# Patient Record
Sex: FEMALE | Race: WHITE | NOT HISPANIC OR LATINO | URBAN - METROPOLITAN AREA
[De-identification: names, ages, dates, MRNs, and addresses within clinical notes are randomized per-mention and may not be internally consistent; named-entity substitution may affect disease eponyms.]

---

## 2022-12-14 ENCOUNTER — OFFICE VISIT (OUTPATIENT)
Dept: URGENT CARE | Facility: CLINIC | Age: 72
End: 2022-12-14
Payer: COMMERCIAL

## 2022-12-14 VITALS
DIASTOLIC BLOOD PRESSURE: 63 MMHG | HEIGHT: 63 IN | SYSTOLIC BLOOD PRESSURE: 133 MMHG | WEIGHT: 145 LBS | OXYGEN SATURATION: 95 % | HEART RATE: 56 BPM | RESPIRATION RATE: 14 BRPM | TEMPERATURE: 98 F | BODY MASS INDEX: 25.69 KG/M2

## 2022-12-14 DIAGNOSIS — Z76.0 ENCOUNTER FOR MEDICATION REFILL: Primary | ICD-10-CM

## 2022-12-14 PROCEDURE — 3075F PR MOST RECENT SYSTOLIC BLOOD PRESS GE 130-139MM HG: ICD-10-PCS | Mod: CPTII,S$GLB,,

## 2022-12-14 PROCEDURE — 3078F DIAST BP <80 MM HG: CPT | Mod: CPTII,S$GLB,,

## 2022-12-14 PROCEDURE — 99203 OFFICE O/P NEW LOW 30 MIN: CPT | Mod: S$GLB,,,

## 2022-12-14 PROCEDURE — 99203 PR OFFICE/OUTPT VISIT, NEW, LEVL III, 30-44 MIN: ICD-10-PCS | Mod: S$GLB,,,

## 2022-12-14 PROCEDURE — 1160F RVW MEDS BY RX/DR IN RCRD: CPT | Mod: CPTII,S$GLB,,

## 2022-12-14 PROCEDURE — 3075F SYST BP GE 130 - 139MM HG: CPT | Mod: CPTII,S$GLB,,

## 2022-12-14 PROCEDURE — 1126F AMNT PAIN NOTED NONE PRSNT: CPT | Mod: CPTII,S$GLB,,

## 2022-12-14 PROCEDURE — 1159F PR MEDICATION LIST DOCUMENTED IN MEDICAL RECORD: ICD-10-PCS | Mod: CPTII,S$GLB,,

## 2022-12-14 PROCEDURE — 1159F MED LIST DOCD IN RCRD: CPT | Mod: CPTII,S$GLB,,

## 2022-12-14 PROCEDURE — 3078F PR MOST RECENT DIASTOLIC BLOOD PRESSURE < 80 MM HG: ICD-10-PCS | Mod: CPTII,S$GLB,,

## 2022-12-14 PROCEDURE — 1126F PR PAIN SEVERITY QUANTIFIED, NO PAIN PRESENT: ICD-10-PCS | Mod: CPTII,S$GLB,,

## 2022-12-14 PROCEDURE — 3008F PR BODY MASS INDEX (BMI) DOCUMENTED: ICD-10-PCS | Mod: CPTII,S$GLB,,

## 2022-12-14 PROCEDURE — 1160F PR REVIEW ALL MEDS BY PRESCRIBER/CLIN PHARMACIST DOCUMENTED: ICD-10-PCS | Mod: CPTII,S$GLB,,

## 2022-12-14 PROCEDURE — 3008F BODY MASS INDEX DOCD: CPT | Mod: CPTII,S$GLB,,

## 2022-12-14 RX ORDER — INDAPAMIDE 2.5 MG/1
2.5 TABLET ORAL DAILY
Qty: 30 TABLET | Refills: 0 | Status: SHIPPED | OUTPATIENT
Start: 2022-12-14 | End: 2023-01-13

## 2022-12-14 RX ORDER — INDAPAMIDE 2.5 MG/1
2.5 TABLET ORAL DAILY
Qty: 30 TABLET | Refills: 0 | Status: SHIPPED | OUTPATIENT
Start: 2022-12-14 | End: 2022-12-14

## 2022-12-14 NOTE — PATIENT INSTRUCTIONS
Please return here or go to the Emergency Department for any concerns, worsening of condition, or if you noticed warning symptoms that we discussed.  Please follow up with your primary care doctor or specialist as needed.  Please continue to monitor your blood pressure.    You were given perindopril arjinin-amlodipin 7-5 mg tab as well as indapamide 2.5 mg to substitute for your perindopril arjinin/indapamid/amlodipin 10/2.5/5 that you take regularly.     If you are not tolerating this medication well, please stop taking it.    If you  smoke, please stop smoking.

## 2022-12-14 NOTE — PROGRESS NOTES
"Subjective:       Patient ID: Dina Dean is a 72 y.o. female.    Vitals:  height is 5' 3" (1.6 m) and weight is 65.8 kg (145 lb). Her oral temperature is 97.6 °F (36.4 °C). Her blood pressure is 133/63 and her pulse is 56 (abnormal). Her respiration is 14 and oxygen saturation is 95%.     Chief Complaint: Hypertension    71 yo female presents to the urgent care with c/o medication refill. Patient states that she ran out of her medication yesterday. Associated symptoms include none. Patient denies CP, SOB, vision changes. Patient is visiting from [place] for a month. She is taking Triplixam 10mg/2.5mg/5mg (perindopril arjinin/indapamid/amlodipin). Her BP at home was 130/80.     Hypertension  This is a chronic problem. The current episode started today. The problem is controlled. Pertinent negatives include no anxiety, blurred vision, chest pain, headaches, malaise/fatigue, neck pain, orthopnea, palpitations, peripheral edema, PND, shortness of breath or sweats. Risk factors for coronary artery disease include diabetes mellitus and post-menopausal state. Treatments tried: Triplixam 10mg/2.5mg/5mg (perindopril arjinin/indapamid/amlodipin) The current treatment provides significant improvement. There is no history of angina, kidney disease, CAD/MI, CVA, heart failure, left ventricular hypertrophy, PVD or retinopathy. There is no history of chronic renal disease, coarctation of the aorta, hyperaldosteronism, hypercortisolism, hyperparathyroidism, a hypertension causing med, pheochromocytoma, renovascular disease, sleep apnea or a thyroid problem.     Constitution: Negative for fatigue and fever.   Neck: Negative for neck pain.   Cardiovascular:  Negative for chest pain and palpitations.   Eyes:  Negative for photophobia, vision loss, double vision and blurred vision.   Respiratory:  Negative for shortness of breath.    Neurological:  Negative for dizziness, light-headedness, passing out, facial drooping, loss of balance, " headaches, disorientation, altered mental status, numbness and tingling.   Psychiatric/Behavioral:  Negative for altered mental status and disorientation.      Objective:      Physical Exam   Constitutional: She is oriented to person, place, and time.  Non-toxic appearance. She does not appear ill. No distress. normal  Eyes: Lids are normal. Pupils are equal, round, and reactive to light. Right pupil is round, reactive and not sluggish. Left pupil is round, reactive and not sluggish. Pupils are equal. vision grossly intact gaze aligned appropriately   Cardiovascular: Regular rhythm, S1 normal, S2 normal and normal heart sounds. Bradycardia present.   No murmur heard.Exam reveals no gallop and no friction rub.   Pulmonary/Chest: Effort normal and breath sounds normal. No stridor. No respiratory distress. She has no wheezes. She has no rhonchi. She has no rales. She exhibits no tenderness.   Abdominal: Normal appearance.   Musculoskeletal:      Right lower leg: No edema.      Left lower leg: No edema.   Neurological: no focal deficit. She is alert, oriented to person, place, and time and at baseline. She has normal motor skills and normal sensation. Coordination: Heel to shin test normal. Gait and coordination normal. Coordination and gait normal. GCS eye subscore is 4. GCS verbal subscore is 5. GCS motor subscore is 6.   Skin: Skin is not diaphoretic.       Assessment:       1. Encounter for medication refill          Plan:    Vital signs reviewed. HR 56, asymptomatic.  Discussed medication refill, home care, tx options, and given follow up precautions.  I have considered potential emergent or life threatening causes of the patient's compliant including but not limited to Hypertensive Urgency/Emergency. Based on the patient's history, exam, and diagnostic findings, I think these diagnoses to be highly unlikely at the present time.   Patient was briefed on my thought process and diagnosis.   Patient involved with the  treatment plan and agreed to the plan.  Patient informed on warning signs, patient understood warning signs.  Patient informed to return to the urgent care or go to the ER if warning signs appear.  [employer]  services used.   Patient discussed with Dr. Lloyd via secure chat,  agrees to the plan.     Patient Instructions   Please return here or go to the Emergency Department for any concerns, worsening of condition, or if you noticed warning symptoms that we discussed.  Please follow up with your primary care doctor or specialist as needed.  Please continue to monitor your blood pressure.    You were given perindopril arjinin-amlodipin 7-5 mg tab as well as indapamide 2.5 mg to substitute for your perindopril arjinin/indapamid/amlodipin 10/2.5/5 that you take regularly.     If you are not tolerating this medication well, please stop taking it.    If you  smoke, please stop smoking.    Encounter for medication refill  -     perindopril arginin-amlodipine 7-5 mg Tab; Take 1 tablet by mouth once daily.  Dispense: 30 tablet; Refill: 0  -     Discontinue: indapamide (LOZOL) 2.5 MG Tab; Take 1 tablet (2.5 mg total) by mouth once daily.  Dispense: 30 tablet; Refill: 0  -     indapamide (LOZOL) 2.5 MG Tab; Take 1 tablet (2.5 mg total) by mouth once daily.  Dispense: 30 tablet; Refill: 0      Ashley Bacon PA-C

## 2022-12-19 ENCOUNTER — TELEPHONE (OUTPATIENT)
Dept: URGENT CARE | Facility: CLINIC | Age: 72
End: 2022-12-19
Payer: COMMERCIAL

## 2022-12-19 DIAGNOSIS — Z76.0 MEDICATION REFILL: Primary | ICD-10-CM

## 2022-12-19 RX ORDER — AMLODIPINE BESYLATE 5 MG/1
5 TABLET ORAL DAILY
Qty: 30 TABLET | Refills: 0 | Status: SHIPPED | OUTPATIENT
Start: 2022-12-19 | End: 2023-12-19

## 2022-12-19 NOTE — TELEPHONE ENCOUNTER
Patient calling and having difficulty finding drug on formulary.  Offered to fill Amlodipine 5mg for her.  She would like this. Sent to desired pharmacy.

## 2022-12-26 ENCOUNTER — TELEPHONE (OUTPATIENT)
Dept: URGENT CARE | Facility: CLINIC | Age: 72
End: 2022-12-26
Payer: COMMERCIAL

## 2023-01-10 ENCOUNTER — OFFICE VISIT (OUTPATIENT)
Dept: URGENT CARE | Facility: CLINIC | Age: 73
End: 2023-01-10
Payer: COMMERCIAL

## 2023-01-10 VITALS
HEIGHT: 63 IN | WEIGHT: 145 LBS | SYSTOLIC BLOOD PRESSURE: 133 MMHG | BODY MASS INDEX: 25.69 KG/M2 | DIASTOLIC BLOOD PRESSURE: 79 MMHG | OXYGEN SATURATION: 96 % | RESPIRATION RATE: 18 BRPM | HEART RATE: 63 BPM | TEMPERATURE: 98 F

## 2023-01-10 DIAGNOSIS — I10 HYPERTENSION, UNSPECIFIED TYPE: Primary | ICD-10-CM

## 2023-01-10 PROCEDURE — 1126F AMNT PAIN NOTED NONE PRSNT: CPT | Mod: CPTII,S$GLB,, | Performed by: NURSE PRACTITIONER

## 2023-01-10 PROCEDURE — 3075F SYST BP GE 130 - 139MM HG: CPT | Mod: CPTII,S$GLB,, | Performed by: NURSE PRACTITIONER

## 2023-01-10 PROCEDURE — 4010F ACE/ARB THERAPY RXD/TAKEN: CPT | Mod: CPTII,S$GLB,, | Performed by: NURSE PRACTITIONER

## 2023-01-10 PROCEDURE — 3008F PR BODY MASS INDEX (BMI) DOCUMENTED: ICD-10-PCS | Mod: CPTII,S$GLB,, | Performed by: NURSE PRACTITIONER

## 2023-01-10 PROCEDURE — 3075F PR MOST RECENT SYSTOLIC BLOOD PRESS GE 130-139MM HG: ICD-10-PCS | Mod: CPTII,S$GLB,, | Performed by: NURSE PRACTITIONER

## 2023-01-10 PROCEDURE — 99213 OFFICE O/P EST LOW 20 MIN: CPT | Mod: S$GLB,,, | Performed by: NURSE PRACTITIONER

## 2023-01-10 PROCEDURE — 4010F PR ACE/ARB THEARPY RXD/TAKEN: ICD-10-PCS | Mod: CPTII,S$GLB,, | Performed by: NURSE PRACTITIONER

## 2023-01-10 PROCEDURE — 1126F PR PAIN SEVERITY QUANTIFIED, NO PAIN PRESENT: ICD-10-PCS | Mod: CPTII,S$GLB,, | Performed by: NURSE PRACTITIONER

## 2023-01-10 PROCEDURE — 3078F DIAST BP <80 MM HG: CPT | Mod: CPTII,S$GLB,, | Performed by: NURSE PRACTITIONER

## 2023-01-10 PROCEDURE — 1159F PR MEDICATION LIST DOCUMENTED IN MEDICAL RECORD: ICD-10-PCS | Mod: CPTII,S$GLB,, | Performed by: NURSE PRACTITIONER

## 2023-01-10 PROCEDURE — 1160F RVW MEDS BY RX/DR IN RCRD: CPT | Mod: CPTII,S$GLB,, | Performed by: NURSE PRACTITIONER

## 2023-01-10 PROCEDURE — 1160F PR REVIEW ALL MEDS BY PRESCRIBER/CLIN PHARMACIST DOCUMENTED: ICD-10-PCS | Mod: CPTII,S$GLB,, | Performed by: NURSE PRACTITIONER

## 2023-01-10 PROCEDURE — 1159F MED LIST DOCD IN RCRD: CPT | Mod: CPTII,S$GLB,, | Performed by: NURSE PRACTITIONER

## 2023-01-10 PROCEDURE — 99213 PR OFFICE/OUTPT VISIT, EST, LEVL III, 20-29 MIN: ICD-10-PCS | Mod: S$GLB,,, | Performed by: NURSE PRACTITIONER

## 2023-01-10 PROCEDURE — 3078F PR MOST RECENT DIASTOLIC BLOOD PRESSURE < 80 MM HG: ICD-10-PCS | Mod: CPTII,S$GLB,, | Performed by: NURSE PRACTITIONER

## 2023-01-10 PROCEDURE — 3008F BODY MASS INDEX DOCD: CPT | Mod: CPTII,S$GLB,, | Performed by: NURSE PRACTITIONER

## 2023-01-10 RX ORDER — PERINDOPRIL ERBUMINE 8 MG/1
8 TABLET ORAL DAILY
Qty: 30 TABLET | Refills: 0 | Status: SHIPPED | OUTPATIENT
Start: 2023-01-10

## 2023-01-10 RX ORDER — INDAPAMIDE 2.5 MG/1
2.5 TABLET ORAL DAILY
Qty: 30 TABLET | Refills: 0 | Status: SHIPPED | OUTPATIENT
Start: 2023-01-10

## 2023-01-10 RX ORDER — AMLODIPINE BESYLATE 5 MG/1
5 TABLET ORAL DAILY
Qty: 30 TABLET | Refills: 0 | Status: SHIPPED | OUTPATIENT
Start: 2023-01-10

## 2023-01-11 NOTE — PATIENT INSTRUCTIONS
See additional patient Instructions provided    Follow up with PCP for continued care, further refills   No added salt diet  Low fat, low cholesterol diet  Avoid smoking and/or tobacco  Avoid caffeine  Maintain appropriate weight  If you are currently prescribed medication for your blood pressure, continue to take as directed  Check BP at home regularly, keep daily diary, notify your PCP for persistent elevation >130/90  Follow up with PCP for routine care    Patient Instructions   - You must understand that you have received an Urgent Care treatment only and that you may be released before all of your medical problems are known or treated.   - You, the patient, will arrange for follow up care as instructed.   - If your condition worsens or fails to improve we recommend that you receive another evaluation at the ER immediately or contact your PCP to discuss your concerns or return here.     Advised on return/follow-up precautions. Advised on ER precautions. Answered all patient questions. Patient verbalized understanding and voiced agreement with current treatment plan.

## 2023-01-11 NOTE — PROGRESS NOTES
"Subjective:       Patient ID: Dina Dean is a 72 y.o. female.    Vitals:  height is 5' 3" (1.6 m) and weight is 65.8 kg (145 lb). Her oral temperature is 98.2 °F (36.8 °C). Her blood pressure is 133/79 and her pulse is 63. Her respiration is 18 and oxygen saturation is 96%.     Chief Complaint: No chief complaint on file.    Patient needs refills on BP meds: seen about 1 month ago for BP medication refill. She is here for a short stay from [place] and forgot her refill medications at home in [place].  She is requesting another refill states she will be returning to [place] in 2 weeks. Denies chest pain, SOB, palpitations, pedal edema. States her BP has been well controlled.     Constitution: Negative for chills, sweating, fatigue and fever.   Neck: Negative for painful lymph nodes.   Cardiovascular:  Negative for chest pain, leg swelling, palpitations and sob on exertion.   Respiratory:  Negative for chest tightness, cough and shortness of breath.    Gastrointestinal:  Negative for abdominal pain, nausea, vomiting, constipation, diarrhea, bright red blood in stool, dark colored stools and rectal bleeding.   Genitourinary:  Negative for dysuria, frequency, urgency, flank pain, hematuria, vaginal pain, vaginal discharge, vaginal bleeding, vaginal odor, genital sore and pelvic pain.   Musculoskeletal:  Negative for trauma, joint pain, joint swelling, abnormal ROM of joint, back pain, muscle cramps, muscle ache and history of spine disorder.   Skin:  Negative for color change, pale, rash, erythema and bruising.   Neurological:  Negative for tingling and seizures.   Hematologic/Lymphatic: Negative for swollen lymph nodes.     Objective:      Physical Exam   Constitutional: She is oriented to person, place, and time. She appears well-developed. She is cooperative.  Non-toxic appearance. She does not appear ill. No distress.   HENT:   Head: Normocephalic and atraumatic.   Ears:   Right Ear: Hearing and external ear normal. "   Left Ear: Hearing and external ear normal.   Nose: Nose normal. No mucosal edema, rhinorrhea, nasal deformity or congestion. No epistaxis. Right sinus exhibits no maxillary sinus tenderness and no frontal sinus tenderness. Left sinus exhibits no maxillary sinus tenderness and no frontal sinus tenderness.   Mouth/Throat: Uvula is midline, oropharynx is clear and moist and mucous membranes are normal. No trismus in the jaw. Normal dentition. No uvula swelling.   Eyes: Conjunctivae and lids are normal. Right eye exhibits no discharge. Left eye exhibits no discharge. No scleral icterus.   Neck: Trachea normal and phonation normal. Neck supple.   Cardiovascular: Normal rate, regular rhythm and normal heart sounds.   Pulmonary/Chest: Effort normal and breath sounds normal. No respiratory distress.   Abdominal: Normal appearance. She exhibits no distension.   Musculoskeletal: Normal range of motion.         General: No deformity. Normal range of motion.      Right lower leg: No edema.      Left lower leg: No edema.   Neurological: She is alert and oriented to person, place, and time. She exhibits normal muscle tone. Coordination normal.   Skin: Skin is warm, dry, intact, not diaphoretic and not pale. No erythema   Psychiatric: Her speech is normal and behavior is normal. Judgment and thought content normal.   Nursing note and vitals reviewed.      Assessment:       1. Hypertension, unspecified type          Plan:         Hypertension, unspecified type  -     indapamide (LOZOL) 2.5 MG Tab; Take 1 tablet (2.5 mg total) by mouth once daily.  Dispense: 30 tablet; Refill: 0  -     amLODIPine (NORVASC) 5 MG tablet; Take 1 tablet (5 mg total) by mouth once daily.  Dispense: 30 tablet; Refill: 0  -     perindopril erbumine (ACEON) 8 mg tablet; Take 1 tablet (8 mg total) by mouth once daily.  Dispense: 30 tablet; Refill: 0                   Patient Instructions   See additional patient Instructions provided    Follow up with PCP  for continued care, further refills   No added salt diet  Low fat, low cholesterol diet  Avoid smoking and/or tobacco  Avoid caffeine  Maintain appropriate weight  If you are currently prescribed medication for your blood pressure, continue to take as directed  Check BP at home regularly, keep daily diary, notify your PCP for persistent elevation >130/90  Follow up with PCP for routine care    Patient Instructions   - You must understand that you have received an Urgent Care treatment only and that you may be released before all of your medical problems are known or treated.   - You, the patient, will arrange for follow up care as instructed.   - If your condition worsens or fails to improve we recommend that you receive another evaluation at the ER immediately or contact your PCP to discuss your concerns or return here.     Advised on return/follow-up precautions. Advised on ER precautions. Answered all patient questions. Patient verbalized understanding and voiced agreement with current treatment plan.